# Patient Record
Sex: FEMALE | Race: WHITE | URBAN - METROPOLITAN AREA
[De-identification: names, ages, dates, MRNs, and addresses within clinical notes are randomized per-mention and may not be internally consistent; named-entity substitution may affect disease eponyms.]

---

## 2022-01-06 ENCOUNTER — OFFICE VISIT (OUTPATIENT)
Dept: URBAN - METROPOLITAN AREA CLINIC 46 | Facility: CLINIC | Age: 77
End: 2022-01-06
Payer: MEDICARE

## 2022-01-06 DIAGNOSIS — H35.3111 NONEXUDATIVE AGE-RELATED MACULAR DEGENERATION, RIGHT EYE, EARLY DRY STAGE: ICD-10-CM

## 2022-01-06 PROCEDURE — 99214 OFFICE O/P EST MOD 30 MIN: CPT | Performed by: OPHTHALMOLOGY

## 2022-01-06 PROCEDURE — 92134 CPTRZ OPH DX IMG PST SGM RTA: CPT | Performed by: OPHTHALMOLOGY

## 2022-01-06 RX ORDER — ESCITALOPRAM 20 MG/1
20 MG TABLET, FILM COATED ORAL
Refills: 0 | Status: ACTIVE
Start: 2022-01-06

## 2022-01-06 RX ORDER — HYPROMELLOSE 2910 (4000 MPA.S) 3 MG/ML
0.3 % SOLUTION/ DROPS OPHTHALMIC; TOPICAL
Refills: 0 | Status: ACTIVE
Start: 2022-01-06

## 2022-01-06 RX ORDER — BIMATOPROST 0.1 MG/ML
0.01 % SOLUTION/ DROPS OPHTHALMIC
Refills: 0 | Status: ACTIVE
Start: 2022-01-06

## 2022-01-06 RX ORDER — DILTIAZEM HYDROCHLORIDE 180 MG/1
180 MG CAPSULE, EXTENDED RELEASE ORAL
Refills: 0 | Status: ACTIVE
Start: 2022-01-06

## 2022-01-06 RX ORDER — LISINOPRIL 20 MG/1
20 MG TABLET ORAL
Refills: 0 | Status: ACTIVE
Start: 2022-01-06

## 2022-01-06 RX ORDER — DORZOLAMIDE HYDROCHLORIDE AND TIMOLOL MALEATE 20; 5 MG/ML; MG/ML
SOLUTION/ DROPS OPHTHALMIC
Refills: 0 | Status: INACTIVE
Start: 2022-01-06 | End: 2022-02-01

## 2022-01-06 RX ORDER — COLCHICINE 0.6 MG/1
0.6 MG TABLET, FILM COATED ORAL
Refills: 0 | Status: ACTIVE
Start: 2022-01-06

## 2022-01-06 RX ORDER — FELODIPINE 5 MG/1
5 MG TABLET, EXTENDED RELEASE ORAL
Refills: 0 | Status: ACTIVE
Start: 2022-01-06

## 2022-01-06 RX ORDER — FUROSEMIDE 40 MG/1
40 MG TABLET ORAL
Refills: 0 | Status: ACTIVE
Start: 2022-01-06

## 2022-01-06 RX ORDER — APIXABAN 5 MG/1
5 MG TABLET, FILM COATED ORAL
Refills: 0 | Status: ACTIVE
Start: 2022-01-06

## 2022-01-06 ASSESSMENT — INTRAOCULAR PRESSURE
OD: 20
OS: 18

## 2022-02-08 ENCOUNTER — OFFICE VISIT (OUTPATIENT)
Dept: URBAN - METROPOLITAN AREA CLINIC 46 | Facility: CLINIC | Age: 77
End: 2022-02-08
Payer: MEDICARE

## 2022-02-08 PROCEDURE — 99212 OFFICE O/P EST SF 10 MIN: CPT | Performed by: OPHTHALMOLOGY

## 2022-02-08 ASSESSMENT — INTRAOCULAR PRESSURE
OS: 18
OD: 22
OS: 20
OD: 20

## 2022-02-08 NOTE — IMPRESSION/PLAN
Impression: Primary open-angle glaucoma, mild stage, bilateral: H40.5711. Plan: Patient advised that current pressure is not within acceptable limits. Additional medication is required. - Continue as previously prescribed Lumigan 0.01 % eye drops : Instill one drop in both eyes at bedtime
 - Continue as previously prescribed Dorzolamide 22.3 mg-timolol 6.8 mg/mL eye drops : Instill one drop in both eyes twice daily - Begin Rhopressa 0.02% eye drops : Instill one drop in right eye at bedtime Only starting Rhopressa in the right eye to see how much it'll drop the pressure. 

Sample given

## 2022-03-08 ENCOUNTER — OFFICE VISIT (OUTPATIENT)
Dept: URBAN - METROPOLITAN AREA CLINIC 46 | Facility: CLINIC | Age: 77
End: 2022-03-08
Payer: MEDICARE

## 2022-03-08 DIAGNOSIS — H40.1131 PRIMARY OPEN-ANGLE GLAUCOMA, MILD STAGE, BILATERAL: Primary | ICD-10-CM

## 2022-03-08 PROCEDURE — 99212 OFFICE O/P EST SF 10 MIN: CPT | Performed by: OPHTHALMOLOGY

## 2022-03-08 RX ORDER — NETARSUDIL 0.2 MG/ML
0.02 % SOLUTION/ DROPS OPHTHALMIC; TOPICAL
Qty: 2.5 | Refills: 11 | Status: ACTIVE
Start: 2022-03-08

## 2022-03-08 ASSESSMENT — INTRAOCULAR PRESSURE
OS: 14
OS: 16
OD: 15
OD: 17

## 2022-03-08 NOTE — IMPRESSION/PLAN
Impression: Primary open-angle glaucoma, mild stage, bilateral: H40.1131. Plan: Patient's intraocular pressure is within acceptable range and examination is unchanged. Continue current treatment. 
 - Continue as previously prescribed Dorzolamide 22.3 mg-timolol 6.8 mg/mL eye drops : Instill one drop in both eyes twice daily
  - Continue as previously prescribed Lumigan 0.01 % eye drops : Instill one drop in both eyes at bedtime
 - Continue as previously prescribed Rhopressa 0.02% eye drops : Instill one drop in both eyes at bedtime

## 2022-06-14 ENCOUNTER — OFFICE VISIT (OUTPATIENT)
Dept: URBAN - METROPOLITAN AREA CLINIC 46 | Facility: CLINIC | Age: 77
End: 2022-06-14
Payer: MEDICARE

## 2022-06-14 DIAGNOSIS — H35.3131 NONEXUDATIVE MACULAR DEGENERATION, EARLY DRY STAGE, BILATERAL: ICD-10-CM

## 2022-06-14 DIAGNOSIS — H40.1131 PRIMARY OPEN-ANGLE GLAUCOMA, MILD STAGE, BILATERAL: Primary | ICD-10-CM

## 2022-06-14 PROCEDURE — 99213 OFFICE O/P EST LOW 20 MIN: CPT | Performed by: OPHTHALMOLOGY

## 2022-06-14 ASSESSMENT — INTRAOCULAR PRESSURE
OD: 18
OS: 18
OD: 19
OS: 19

## 2022-06-14 NOTE — IMPRESSION/PLAN
Impression: Nonexudative macular degeneration, early dry stage, bilateral: H35.4811. Plan: - Continue as previously prescribed Dorzolamide 22.3 mg-timolol 6.8 mg/mL eye drops : Instill one drop in both eyes twice daily  - Continue as previously prescribed Lumigan 0.01 % eye drops : Instill one drop in both eyes at bedtime.  Gave sample of Rhopressa

## 2022-06-14 NOTE — IMPRESSION/PLAN
Impression: Nonexudative macular degeneration, early dry stage, bilateral: H35.9513. Plan: Patient advised that their macular degeneration appears stable. They are advised to continue AREDS/AREDS2 and the Amsler grid. We will continue to monitor periodically; call if any changes noted.

## 2022-07-19 ENCOUNTER — OFFICE VISIT (OUTPATIENT)
Dept: URBAN - METROPOLITAN AREA CLINIC 46 | Facility: CLINIC | Age: 77
End: 2022-07-19
Payer: MEDICARE

## 2022-07-19 DIAGNOSIS — H40.1131 PRIMARY OPEN-ANGLE GLAUCOMA, MILD STAGE, BILATERAL: Primary | ICD-10-CM

## 2022-07-19 DIAGNOSIS — H26.493 OTHER SECONDARY CATARACT, BILATERAL: ICD-10-CM

## 2022-07-19 PROCEDURE — 99213 OFFICE O/P EST LOW 20 MIN: CPT | Performed by: OPHTHALMOLOGY

## 2022-07-19 RX ORDER — NETARSUDIL 0.2 MG/ML
0.02 % SOLUTION/ DROPS OPHTHALMIC; TOPICAL
Qty: 2.5 | Refills: 11 | Status: ACTIVE
Start: 2022-07-19

## 2022-07-19 ASSESSMENT — INTRAOCULAR PRESSURE
OS: 14
OD: 16
OS: 11
OD: 12

## 2022-07-19 NOTE — IMPRESSION/PLAN
Impression: Primary open-angle glaucoma, mild stage, bilateral: H40.1131. Plan: - Continue as previously prescribed Dorzolamide 22.3 mg-timolol 6.8 mg/mL eye drops : Instill one drop in both eyes twice daily, Lumigan at bedtime and Rhopressa at bedtime in both eyes.

## 2022-12-01 ENCOUNTER — OFFICE VISIT (OUTPATIENT)
Dept: URBAN - METROPOLITAN AREA CLINIC 46 | Facility: CLINIC | Age: 77
End: 2022-12-01
Payer: MEDICARE

## 2022-12-01 DIAGNOSIS — H35.3131 NONEXUDATIVE MACULAR DEGENERATION, EARLY DRY STAGE, BILATERAL: ICD-10-CM

## 2022-12-01 DIAGNOSIS — H40.1131 PRIMARY OPEN-ANGLE GLAUCOMA, MILD STAGE, BILATERAL: Primary | ICD-10-CM

## 2022-12-01 PROCEDURE — 99213 OFFICE O/P EST LOW 20 MIN: CPT | Performed by: OPHTHALMOLOGY

## 2022-12-01 ASSESSMENT — INTRAOCULAR PRESSURE
OS: 21
OD: 21

## 2022-12-01 NOTE — IMPRESSION/PLAN
Impression: Nonexudative macular degeneration, early dry stage, bilateral: H35.1576. Plan: Patient advised that their macular degeneration appears stable. They are advised to continue AREDS/AREDS2 and the Amsler grid. We will continue to monitor periodically; call if any changes noted.
